# Patient Record
Sex: FEMALE | Race: ASIAN | NOT HISPANIC OR LATINO
[De-identification: names, ages, dates, MRNs, and addresses within clinical notes are randomized per-mention and may not be internally consistent; named-entity substitution may affect disease eponyms.]

---

## 2017-07-19 ENCOUNTER — HOSPITAL (OUTPATIENT)
Dept: OTHER | Age: 67
End: 2017-07-19
Attending: INTERNAL MEDICINE

## 2018-05-12 ENCOUNTER — OFFICE VISIT (OUTPATIENT)
Dept: FAMILY MEDICINE CLINIC | Facility: CLINIC | Age: 68
End: 2018-05-12

## 2018-05-12 VITALS
OXYGEN SATURATION: 99 % | DIASTOLIC BLOOD PRESSURE: 68 MMHG | SYSTOLIC BLOOD PRESSURE: 126 MMHG | TEMPERATURE: 98 F | RESPIRATION RATE: 16 BRPM | WEIGHT: 137 LBS | HEART RATE: 70 BPM

## 2018-05-12 DIAGNOSIS — B37.81 THRUSH OF MOUTH AND ESOPHAGUS (HCC): Primary | ICD-10-CM

## 2018-05-12 DIAGNOSIS — B37.0 THRUSH OF MOUTH AND ESOPHAGUS (HCC): Primary | ICD-10-CM

## 2018-05-12 PROCEDURE — 99213 OFFICE O/P EST LOW 20 MIN: CPT | Performed by: NURSE PRACTITIONER

## 2018-05-12 RX ORDER — METOPROLOL SUCCINATE 25 MG/1
TABLET, EXTENDED RELEASE ORAL
Refills: 0 | COMMUNITY
Start: 2018-05-03

## 2018-05-12 RX ORDER — PANTOPRAZOLE SODIUM 40 MG/1
TABLET, DELAYED RELEASE ORAL
Refills: 0 | COMMUNITY
Start: 2018-04-05

## 2018-05-12 RX ORDER — ASPIRIN 81 MG/1
TABLET, COATED ORAL
Refills: 0 | COMMUNITY
Start: 2018-05-03

## 2018-05-12 RX ORDER — ATORVASTATIN CALCIUM 10 MG/1
TABLET, FILM COATED ORAL
Refills: 0 | COMMUNITY
Start: 2018-05-03

## 2018-05-12 NOTE — PATIENT INSTRUCTIONS
Use the Nystatin 5ml  4 times a day swish and swallow      Candida Infection: Donn Purl is a fungal infection in the mouth and throat. Dierdre Gambler does not usually affect healthy adults. It is more common in people with a weak immune system.  It is also mor Your healthcare provider will ask about your medical history and your symptoms. He or she will look closely at your mouth and throat. White or red patches will be scraped with a tongue depressor.  The sample will be sent to a lab to test. This test can [facility] · Practice good oral hygiene. Try using a chlorhexidine mouthwash. · Clean your dentures regularly as instructed. Make sure they fit you correctly. · After using a corticosteroid inhaler, rinse out your mouth with water or mouthwash.   · Do not use broad-

## 2018-05-12 NOTE — PROGRESS NOTES
CHIEF COMPLAINT:   Patient presents with:  Bronchitis: bronchitis last week was prescribed clarithomycin and cherratussin now c/o sore throat , white spots on sore tongue and loss of taste buds after 4 days taking the meds         HPI:   Rui Nguyen is GENERAL: well developed, well nourished,in no apparent distress  SKIN: no rashes,no suspicious lesions  HEAD: atraumatic, normocephalic  EYES: conjunctiva clear, EOM intact  EARS: TM's clear, non-injected, no bulging, retraction, or fluid bilaterally  NOSE Yeasts are a type of fungus. A type of yeast called Candida normally lives on the membranes of your mouth and throat. Usually, this yeast grows only in small amounts and is harmless. But in some cases, Candida can grow out of control and cause thrush.  Thru Treatment for thrush  Leon Fontan is usually treated with antifungal medicine. The medicine is put directly in your mouth and throat. You may be given a “swish and swallow” medicine or an antifungal lozenge. In some cases, you may need an antifungal pill.  This · White patches or plaques on your tongue or inside your mouth   Date Last Reviewed: 5/1/2017  © 4607-8055 [employer]. [address] [facility], [address] [place] [address]. All rights reserved.  This information is not intended as a substitute for rita

## 2019-09-26 ENCOUNTER — HOSPITAL (OUTPATIENT)
Dept: OTHER | Age: 69
End: 2019-09-26
Attending: FAMILY MEDICINE

## 2019-11-21 ENCOUNTER — TELEPHONE (OUTPATIENT)
Dept: FAMILY MEDICINE CLINIC | Facility: CLINIC | Age: 69
End: 2019-11-21

## 2019-11-21 ENCOUNTER — OFFICE VISIT (OUTPATIENT)
Dept: FAMILY MEDICINE CLINIC | Facility: CLINIC | Age: 69
End: 2019-11-21
Payer: MEDICAID

## 2019-11-21 VITALS
WEIGHT: 139 LBS | SYSTOLIC BLOOD PRESSURE: 128 MMHG | DIASTOLIC BLOOD PRESSURE: 72 MMHG | TEMPERATURE: 99 F | HEART RATE: 77 BPM | OXYGEN SATURATION: 98 % | RESPIRATION RATE: 16 BRPM

## 2019-11-21 DIAGNOSIS — S09.91XA TRAUMA OF EAR CANAL, INITIAL ENCOUNTER: Primary | ICD-10-CM

## 2019-11-21 PROCEDURE — 99213 OFFICE O/P EST LOW 20 MIN: CPT | Performed by: NURSE PRACTITIONER

## 2019-11-21 RX ORDER — CIPROFLOXACIN AND DEXAMETHASONE 3; 1 MG/ML; MG/ML
4 SUSPENSION/ DROPS AURICULAR (OTIC) 2 TIMES DAILY
Qty: 1 BOTTLE | Refills: 0 | Status: SHIPPED | OUTPATIENT
Start: 2019-11-21 | End: 2019-11-21

## 2019-11-21 RX ORDER — FLUOCINOLONE ACETONIDE 0.11 MG/ML
3 OIL AURICULAR (OTIC)
Qty: 1 BOTTLE | Refills: 0 | Status: SHIPPED | OUTPATIENT
Start: 2019-11-21 | End: 2019-11-30

## 2019-11-22 NOTE — PROGRESS NOTES
CHIEF COMPLAINT:   Patient presents with:  Ear Drainage: left ear blood noticed this morning. HPI:   Mitzi Mello is a 71year old female who presents to clinic today with complaints of bleeding from left ear.   Here with son whom is present in exam Position: Sitting, Cuff Size: adult)   Pulse 77   Temp 98.6 °F (37 °C) (Oral)   Resp 16   Wt 139 lb (63 kg)   SpO2 98%   GENERAL: well developed, well nourished, and in no apparent distress  SKIN: no rashes, no suspicious lesions  HEAD: atraumatic, normoce antibiotic. Tylenol/Motrin prn pain. Call or return if s/sx worsen, do not improve in 3 days, or if fever of 100.4 or greater persists for 72 hours. Patient voiced understand and is in agreement with treatment plan.

## 2019-11-30 ENCOUNTER — OFFICE VISIT (OUTPATIENT)
Dept: FAMILY MEDICINE CLINIC | Facility: CLINIC | Age: 69
End: 2019-11-30
Payer: MEDICAID

## 2019-11-30 VITALS
SYSTOLIC BLOOD PRESSURE: 132 MMHG | RESPIRATION RATE: 16 BRPM | TEMPERATURE: 98 F | HEIGHT: 62 IN | BODY MASS INDEX: 25.62 KG/M2 | DIASTOLIC BLOOD PRESSURE: 74 MMHG | WEIGHT: 139.19 LBS | OXYGEN SATURATION: 99 % | HEART RATE: 73 BPM

## 2019-11-30 DIAGNOSIS — M79.601 ARM PAIN, DIFFUSE, RIGHT: Primary | ICD-10-CM

## 2019-11-30 PROCEDURE — 99213 OFFICE O/P EST LOW 20 MIN: CPT | Performed by: NURSE PRACTITIONER

## 2019-11-30 RX ORDER — LORATADINE 10 MG/1
TABLET ORAL
Refills: 0 | COMMUNITY
Start: 2019-11-07 | End: 2020-02-11

## 2019-11-30 RX ORDER — CYCLOBENZAPRINE HCL 5 MG
5 TABLET ORAL 3 TIMES DAILY PRN
Qty: 15 TABLET | Refills: 0 | Status: SHIPPED | OUTPATIENT
Start: 2019-11-30 | End: 2019-12-05

## 2019-11-30 RX ORDER — NAPROXEN SODIUM 550 MG/1
550 TABLET ORAL 2 TIMES DAILY WITH MEALS
Qty: 28 TABLET | Refills: 0 | Status: SHIPPED | OUTPATIENT
Start: 2019-11-30 | End: 2019-12-14

## 2019-11-30 NOTE — PATIENT INSTRUCTIONS
No ibuprofen while on Naproxen    Myalgias  Myalgias are another word for muscle aches and soreness. This is a symptom, not a disease. Myalgias can have many causes. A cold, the flu, or an acute infection can cause them. So can any illness with a high fe © 9336-9385 [employer]. [address] [facility] [place] [facility] [place]. All rights reserved. This information is not intended as a substitute for professional medical care. Always follow your healthcare professional's instructions.

## 2019-12-04 NOTE — PROGRESS NOTES
Patient presents with:  Muscle Pain: right arm pain [id number]    HPI:     Betty Ponce is a 71year old female who presents with a chief complaint of neck pain  Onset:  gradually started a few  months ago. Cause: unknown.    Radiation: with radiation to right Resp 16   Ht 62\"   Wt 139 lb 3.2 oz (63.1 kg)   SpO2 99%   BMI 25.46 kg/m²     General: Well-nourished, well hydrated. No acute distress. No pallor. No tachypnea  HEENT: normocephaly, atraumatic. PERRL bilaterally.  Sclera clear and non icteric bilateral TK 1 T PO ONCE A DAY, Disp: , Rfl: 0  atorvastatin 10 MG Oral Tab, TK 1 T PO QD HS, Disp: , Rfl: 0  ASPIRIN LOW DOSE 81 MG Oral Tab EC, TK 1 T PO ONCE D, Disp: , Rfl: 0  Pantoprazole Sodium 40 MG Oral Tab EC, TK 1 T PO QD, Disp: , Rfl: 0  [DISCONTINUED] Fl

## 2020-02-11 ENCOUNTER — OFFICE VISIT (OUTPATIENT)
Dept: FAMILY MEDICINE CLINIC | Facility: CLINIC | Age: 70
End: 2020-02-11
Payer: MEDICAID

## 2020-02-11 VITALS
SYSTOLIC BLOOD PRESSURE: 134 MMHG | RESPIRATION RATE: 16 BRPM | HEIGHT: 62 IN | WEIGHT: 143 LBS | TEMPERATURE: 101 F | DIASTOLIC BLOOD PRESSURE: 80 MMHG | BODY MASS INDEX: 26.31 KG/M2 | HEART RATE: 72 BPM | OXYGEN SATURATION: 98 %

## 2020-02-11 DIAGNOSIS — J11.1 INFLUENZA-LIKE ILLNESS: Primary | ICD-10-CM

## 2020-02-11 DIAGNOSIS — J02.9 SORE THROAT: ICD-10-CM

## 2020-02-11 DIAGNOSIS — R52 BODY ACHES: ICD-10-CM

## 2020-02-11 PROCEDURE — 99213 OFFICE O/P EST LOW 20 MIN: CPT | Performed by: NURSE PRACTITIONER

## 2020-02-11 RX ORDER — AZITHROMYCIN 250 MG/1
TABLET, FILM COATED ORAL
Qty: 6 TABLET | Refills: 0 | Status: SHIPPED | OUTPATIENT
Start: 2020-02-11

## 2020-02-11 RX ORDER — IBUPROFEN 600 MG/1
600 TABLET ORAL EVERY 6 HOURS PRN
Qty: 30 TABLET | Refills: 0 | Status: SHIPPED | OUTPATIENT
Start: 2020-02-11 | End: 2020-02-21

## 2020-02-11 RX ORDER — OSELTAMIVIR PHOSPHATE 75 MG/1
75 CAPSULE ORAL 2 TIMES DAILY
Qty: 10 CAPSULE | Refills: 0 | Status: SHIPPED | OUTPATIENT
Start: 2020-02-11 | End: 2020-02-16

## 2020-02-11 NOTE — PATIENT INSTRUCTIONS
· Start Tamiflu twice daily for five days. Take with food. · Use Zithromax as directed on package. Take with food. · See below for comfort. Follow up with Dr. Edouard Marshall if not improved in 1 week.       The Flu (Influenza)     The virus that causes the flu spr older  · People with a chronic illness such as diabetes or heart, kidney, or lung disease  · People who live in a nursing home or long-term care facility  How is the flu treated? The flu usually gets better after 7 days or so.  In some cases, your healthca available in your area. The vaccine is usually given as a shot, which is usually the first choice of experts. Other forms like a nasal spray or needle-free vaccine are available for some people.  Sid January tell you which vaccine is right providers wash their hands or use an alcohol-based hand  before and after treating each patient. · People with the flu have private rooms and bathrooms or share a room with someone with the same infection.   · People who are at high risk for the flu

## 2020-02-11 NOTE — PROGRESS NOTES
HPI:   Yulisa Hernandez is a 71year old female who presents with ill symptoms for  1  days. Patient reports sudden onset of sore throat, body aches, congestion, fever, headache.  Has tried Tylenol with some temporary relief.     loratadine 10 MG Oral Tab, TK -     Oseltamivir Phosphate 75 MG Oral Cap; Take 1 capsule (75 mg total) by mouth 2 (two) times daily for 5 days. Sore throat  -     azithromycin (ZITHROMAX Z-LEONARD) 250 MG Oral Tab; Take two tablets by mouth today, then one tablet daily.     Body aches  - The flu is caused by a virus. The virus spreads through the air in droplets when someone who has the flu coughs, sneezes, laughs, or talks. You can become infected when you inhale these viruses directly.  You can also become infected when you touch a surfac · Wash your hands often, especially after coughing or sneezing. Or clean your hands with an alcohol-based hand  containing at least 60% alcohol. · Cough or sneeze into a tissue. Then throw the tissue away and wash your hands.  If you don’t have a ti Handwashing is one of the best ways to prevent many common infections. If you are caring for or visiting someone with the flu, wash your hands each time you enter and leave the room. Follow these steps:  · Use warm water and plenty of soap.  Rub your hands